# Patient Record
Sex: FEMALE | Race: WHITE | NOT HISPANIC OR LATINO
[De-identification: names, ages, dates, MRNs, and addresses within clinical notes are randomized per-mention and may not be internally consistent; named-entity substitution may affect disease eponyms.]

---

## 2017-02-13 ENCOUNTER — APPOINTMENT (OUTPATIENT)
Dept: ENDOCRINOLOGY | Facility: CLINIC | Age: 37
End: 2017-02-13

## 2017-02-13 ENCOUNTER — APPOINTMENT (OUTPATIENT)
Dept: MATERNAL FETAL MEDICINE | Facility: CLINIC | Age: 37
End: 2017-02-13

## 2017-02-13 VITALS
OXYGEN SATURATION: 98 % | HEART RATE: 104 BPM | DIASTOLIC BLOOD PRESSURE: 70 MMHG | BODY MASS INDEX: 32.44 KG/M2 | HEIGHT: 64 IN | SYSTOLIC BLOOD PRESSURE: 120 MMHG | WEIGHT: 190 LBS

## 2017-02-13 DIAGNOSIS — O24.410 GESTATIONAL DIABETES MELLITUS IN PREGNANCY, DIET CONTROLLED: ICD-10-CM

## 2017-02-13 RX ORDER — BLOOD SUGAR DIAGNOSTIC
STRIP MISCELLANEOUS
Qty: 1 | Refills: 6 | Status: COMPLETED | COMMUNITY
Start: 2017-02-13 | End: 2018-02-13

## 2017-02-13 RX ORDER — LANCETS 33 GAUGE
EACH MISCELLANEOUS
Qty: 1 | Refills: 5 | Status: COMPLETED | COMMUNITY
Start: 2017-02-13 | End: 2018-02-13

## 2017-02-13 RX ORDER — URINE ACETONE TEST STRIPS
STRIP MISCELLANEOUS
Qty: 1 | Refills: 6 | Status: COMPLETED | COMMUNITY
Start: 2017-02-13 | End: 2018-02-13

## 2017-02-15 ENCOUNTER — APPOINTMENT (OUTPATIENT)
Dept: MATERNAL FETAL MEDICINE | Facility: CLINIC | Age: 37
End: 2017-02-15

## 2017-02-15 DIAGNOSIS — O24.419 GESTATIONAL DIABETES MELLITUS IN PREGNANCY, UNSPECIFIED CONTROL: ICD-10-CM

## 2017-02-15 RX ORDER — METFORMIN ER 500 MG 500 MG/1
500 TABLET ORAL
Qty: 120 | Refills: 3 | Status: ACTIVE | COMMUNITY
Start: 2017-02-15 | End: 1900-01-01

## 2017-02-16 ENCOUNTER — APPOINTMENT (OUTPATIENT)
Dept: ANTEPARTUM | Facility: CLINIC | Age: 37
End: 2017-02-16

## 2017-02-16 ENCOUNTER — ASOB RESULT (OUTPATIENT)
Age: 37
End: 2017-02-16

## 2017-03-02 ENCOUNTER — APPOINTMENT (OUTPATIENT)
Dept: MATERNAL FETAL MEDICINE | Facility: CLINIC | Age: 37
End: 2017-03-02

## 2017-03-15 ENCOUNTER — APPOINTMENT (OUTPATIENT)
Dept: ANTEPARTUM | Facility: CLINIC | Age: 37
End: 2017-03-15

## 2017-03-15 ENCOUNTER — APPOINTMENT (OUTPATIENT)
Dept: MATERNAL FETAL MEDICINE | Facility: CLINIC | Age: 37
End: 2017-03-15

## 2017-03-15 ENCOUNTER — ASOB RESULT (OUTPATIENT)
Age: 37
End: 2017-03-15

## 2017-03-15 ENCOUNTER — LABORATORY RESULT (OUTPATIENT)
Age: 37
End: 2017-03-15

## 2017-03-15 RX ORDER — PEN NEEDLE, DIABETIC 29 G X1/2"
32G X 4 MM NEEDLE, DISPOSABLE MISCELLANEOUS
Qty: 1 | Refills: 3 | Status: ACTIVE | COMMUNITY
Start: 2017-03-15 | End: 1900-01-01

## 2017-03-15 RX ORDER — INSULIN HUMAN 100 [IU]/ML
100 INJECTION, SUSPENSION SUBCUTANEOUS
Qty: 1 | Refills: 3 | Status: ACTIVE | COMMUNITY
Start: 2017-03-15 | End: 1900-01-01

## 2017-03-22 ENCOUNTER — APPOINTMENT (OUTPATIENT)
Dept: ENDOCRINOLOGY | Facility: CLINIC | Age: 37
End: 2017-03-22

## 2017-03-23 LAB
T3 SERPL-MCNC: 141 NG/DL
T3RU NFR SERPL: 1.34 INDEX
T4 SERPL-MCNC: 9.6 UG/DL
TSH SERPL-ACNC: 1.15 UIU/ML

## 2017-03-29 ENCOUNTER — APPOINTMENT (OUTPATIENT)
Dept: MATERNAL FETAL MEDICINE | Facility: CLINIC | Age: 37
End: 2017-03-29

## 2017-04-07 ENCOUNTER — APPOINTMENT (OUTPATIENT)
Dept: ANTEPARTUM | Facility: CLINIC | Age: 37
End: 2017-04-07

## 2017-04-11 ENCOUNTER — APPOINTMENT (OUTPATIENT)
Dept: ANTEPARTUM | Facility: CLINIC | Age: 37
End: 2017-04-11

## 2017-04-11 ENCOUNTER — ASOB RESULT (OUTPATIENT)
Age: 37
End: 2017-04-11

## 2017-04-19 ENCOUNTER — APPOINTMENT (OUTPATIENT)
Dept: MATERNAL FETAL MEDICINE | Facility: CLINIC | Age: 37
End: 2017-04-19

## 2017-05-01 ENCOUNTER — ASOB RESULT (OUTPATIENT)
Age: 37
End: 2017-05-01

## 2017-05-01 ENCOUNTER — OUTPATIENT (OUTPATIENT)
Dept: OUTPATIENT SERVICES | Facility: HOSPITAL | Age: 37
LOS: 1 days | End: 2017-05-01
Payer: COMMERCIAL

## 2017-05-01 ENCOUNTER — APPOINTMENT (OUTPATIENT)
Dept: ANTEPARTUM | Facility: CLINIC | Age: 37
End: 2017-05-01

## 2017-05-01 DIAGNOSIS — Z01.818 ENCOUNTER FOR OTHER PREPROCEDURAL EXAMINATION: ICD-10-CM

## 2017-05-01 PROCEDURE — 76816 OB US FOLLOW-UP PER FETUS: CPT

## 2017-05-01 PROCEDURE — 76818 FETAL BIOPHYS PROFILE W/NST: CPT

## 2017-05-03 ENCOUNTER — TRANSCRIPTION ENCOUNTER (OUTPATIENT)
Age: 37
End: 2017-05-03

## 2017-05-03 ENCOUNTER — INPATIENT (INPATIENT)
Facility: HOSPITAL | Age: 37
LOS: 2 days | Discharge: HOME | End: 2017-05-06
Attending: OBSTETRICS & GYNECOLOGY | Admitting: OBSTETRICS & GYNECOLOGY
Payer: COMMERCIAL

## 2017-05-03 ENCOUNTER — OUTPATIENT (OUTPATIENT)
Dept: OUTPATIENT SERVICES | Facility: HOSPITAL | Age: 37
LOS: 1 days | End: 2017-05-03
Payer: COMMERCIAL

## 2017-05-03 VITALS — WEIGHT: 191.8 LBS | HEIGHT: 64 IN

## 2017-05-03 DIAGNOSIS — Z3A.00 WEEKS OF GESTATION OF PREGNANCY NOT SPECIFIED: ICD-10-CM

## 2017-05-03 DIAGNOSIS — O26.899 OTHER SPECIFIED PREGNANCY RELATED CONDITIONS, UNSPECIFIED TRIMESTER: ICD-10-CM

## 2017-05-03 DIAGNOSIS — O47.1 FALSE LABOR AT OR AFTER 37 COMPLETED WEEKS OF GESTATION: ICD-10-CM

## 2017-05-03 DIAGNOSIS — Z34.83 ENCOUNTER FOR SUPERVISION OF OTHER NORMAL PREGNANCY, THIRD TRIMESTER: ICD-10-CM

## 2017-05-03 DIAGNOSIS — D25.9 LEIOMYOMA OF UTERUS, UNSPECIFIED: ICD-10-CM

## 2017-05-03 LAB
BASOPHILS # BLD AUTO: 0.1 K/UL — SIGNIFICANT CHANGE UP (ref 0–0.2)
BASOPHILS NFR BLD AUTO: 0.7 % — SIGNIFICANT CHANGE UP (ref 0–2)
BLD GP AB SCN SERPL QL: NEGATIVE — SIGNIFICANT CHANGE UP
EOSINOPHIL # BLD AUTO: 0.4 K/UL — SIGNIFICANT CHANGE UP (ref 0–0.5)
EOSINOPHIL NFR BLD AUTO: 3.3 % — SIGNIFICANT CHANGE UP (ref 0–6)
HCT VFR BLD CALC: 44.2 % — SIGNIFICANT CHANGE UP (ref 34.5–45)
HGB BLD-MCNC: 15.3 G/DL — SIGNIFICANT CHANGE UP (ref 11.5–15.5)
LYMPHOCYTES # BLD AUTO: 18 % — SIGNIFICANT CHANGE UP (ref 13–44)
LYMPHOCYTES # BLD AUTO: 2.1 K/UL — SIGNIFICANT CHANGE UP (ref 1–3.3)
MCHC RBC-ENTMCNC: 31.9 PG — SIGNIFICANT CHANGE UP (ref 27–34)
MCHC RBC-ENTMCNC: 34.6 GM/DL — SIGNIFICANT CHANGE UP (ref 32–36)
MCV RBC AUTO: 92.2 FL — SIGNIFICANT CHANGE UP (ref 80–100)
MONOCYTES # BLD AUTO: 0.8 K/UL — SIGNIFICANT CHANGE UP (ref 0–0.9)
MONOCYTES NFR BLD AUTO: 6.9 % — SIGNIFICANT CHANGE UP (ref 2–14)
NEUTROPHILS # BLD AUTO: 8.2 K/UL — HIGH (ref 1.8–7.4)
NEUTROPHILS NFR BLD AUTO: 71.2 % — SIGNIFICANT CHANGE UP (ref 43–77)
PLATELET # BLD AUTO: 212 K/UL — SIGNIFICANT CHANGE UP (ref 150–400)
RBC # BLD: 4.8 M/UL — SIGNIFICANT CHANGE UP (ref 3.8–5.2)
RBC # FLD: 12.5 % — SIGNIFICANT CHANGE UP (ref 10.3–14.5)
RH IG SCN BLD-IMP: POSITIVE — SIGNIFICANT CHANGE UP
RH IG SCN BLD-IMP: POSITIVE — SIGNIFICANT CHANGE UP
WBC # BLD: 11.5 K/UL — HIGH (ref 3.8–10.5)
WBC # FLD AUTO: 11.5 K/UL — HIGH (ref 3.8–10.5)

## 2017-05-03 PROCEDURE — G0463: CPT

## 2017-05-03 RX ORDER — SODIUM CHLORIDE 9 MG/ML
1000 INJECTION, SOLUTION INTRAVENOUS
Qty: 0 | Refills: 0 | Status: DISCONTINUED | OUTPATIENT
Start: 2017-05-03 | End: 2017-05-03

## 2017-05-03 RX ORDER — LEVOTHYROXINE SODIUM 125 MCG
25 TABLET ORAL DAILY
Qty: 0 | Refills: 0 | Status: DISCONTINUED | OUTPATIENT
Start: 2017-05-03 | End: 2017-05-06

## 2017-05-03 RX ORDER — OXYTOCIN 10 UNIT/ML
41.67 VIAL (ML) INJECTION
Qty: 20 | Refills: 0 | Status: DISCONTINUED | OUTPATIENT
Start: 2017-05-03 | End: 2017-05-04

## 2017-05-03 RX ORDER — SODIUM CHLORIDE 9 MG/ML
1000 INJECTION, SOLUTION INTRAVENOUS ONCE
Qty: 0 | Refills: 0 | Status: COMPLETED | OUTPATIENT
Start: 2017-05-03 | End: 2017-05-03

## 2017-05-03 RX ORDER — FERROUS SULFATE 325(65) MG
325 TABLET ORAL DAILY
Qty: 0 | Refills: 0 | Status: DISCONTINUED | OUTPATIENT
Start: 2017-05-04 | End: 2017-05-06

## 2017-05-03 RX ORDER — CEFAZOLIN SODIUM 1 G
2000 VIAL (EA) INJECTION ONCE
Qty: 0 | Refills: 0 | Status: DISCONTINUED | OUTPATIENT
Start: 2017-05-03 | End: 2017-05-03

## 2017-05-03 RX ORDER — OXYCODONE HYDROCHLORIDE 5 MG/1
5 TABLET ORAL EVERY 4 HOURS
Qty: 0 | Refills: 0 | Status: DISCONTINUED | OUTPATIENT
Start: 2017-05-03 | End: 2017-05-06

## 2017-05-03 RX ORDER — DIPHENHYDRAMINE HCL 50 MG
25 CAPSULE ORAL EVERY 6 HOURS
Qty: 0 | Refills: 0 | Status: DISCONTINUED | OUTPATIENT
Start: 2017-05-04 | End: 2017-05-06

## 2017-05-03 RX ORDER — DOCUSATE SODIUM 100 MG
100 CAPSULE ORAL
Qty: 0 | Refills: 0 | Status: DISCONTINUED | OUTPATIENT
Start: 2017-05-04 | End: 2017-05-06

## 2017-05-03 RX ORDER — LANOLIN
1 OINTMENT (GRAM) TOPICAL
Qty: 0 | Refills: 0 | Status: DISCONTINUED | OUTPATIENT
Start: 2017-05-04 | End: 2017-05-06

## 2017-05-03 RX ORDER — OXYTOCIN 10 UNIT/ML
41.67 VIAL (ML) INJECTION
Qty: 20 | Refills: 0 | Status: DISCONTINUED | OUTPATIENT
Start: 2017-05-04 | End: 2017-05-06

## 2017-05-03 RX ORDER — SIMETHICONE 80 MG/1
80 TABLET, CHEWABLE ORAL EVERY 4 HOURS
Qty: 0 | Refills: 0 | Status: DISCONTINUED | OUTPATIENT
Start: 2017-05-04 | End: 2017-05-06

## 2017-05-03 RX ORDER — KETOROLAC TROMETHAMINE 30 MG/ML
30 SYRINGE (ML) INJECTION EVERY 6 HOURS
Qty: 0 | Refills: 0 | Status: DISCONTINUED | OUTPATIENT
Start: 2017-05-03 | End: 2017-05-06

## 2017-05-03 RX ORDER — DEXAMETHASONE 0.5 MG/5ML
4 ELIXIR ORAL EVERY 6 HOURS
Qty: 0 | Refills: 0 | Status: DISCONTINUED | OUTPATIENT
Start: 2017-05-04 | End: 2017-05-05

## 2017-05-03 RX ORDER — HEPARIN SODIUM 5000 [USP'U]/ML
5000 INJECTION INTRAVENOUS; SUBCUTANEOUS EVERY 12 HOURS
Qty: 0 | Refills: 0 | Status: DISCONTINUED | OUTPATIENT
Start: 2017-05-04 | End: 2017-05-06

## 2017-05-03 RX ORDER — TETANUS TOXOID, REDUCED DIPHTHERIA TOXOID AND ACELLULAR PERTUSSIS VACCINE, ADSORBED 5; 2.5; 8; 8; 2.5 [IU]/.5ML; [IU]/.5ML; UG/.5ML; UG/.5ML; UG/.5ML
0.5 SUSPENSION INTRAMUSCULAR ONCE
Qty: 0 | Refills: 0 | Status: DISCONTINUED | OUTPATIENT
Start: 2017-05-04 | End: 2017-05-06

## 2017-05-03 RX ORDER — ACETAMINOPHEN 500 MG
975 TABLET ORAL EVERY 6 HOURS
Qty: 0 | Refills: 0 | Status: COMPLETED | OUTPATIENT
Start: 2017-05-03 | End: 2018-04-01

## 2017-05-03 RX ORDER — SODIUM CHLORIDE 9 MG/ML
1000 INJECTION, SOLUTION INTRAVENOUS
Qty: 0 | Refills: 0 | Status: DISCONTINUED | OUTPATIENT
Start: 2017-05-03 | End: 2017-05-04

## 2017-05-03 RX ORDER — ONDANSETRON 8 MG/1
4 TABLET, FILM COATED ORAL EVERY 6 HOURS
Qty: 0 | Refills: 0 | Status: DISCONTINUED | OUTPATIENT
Start: 2017-05-04 | End: 2017-05-05

## 2017-05-03 RX ORDER — OXYCODONE HYDROCHLORIDE 5 MG/1
5 TABLET ORAL
Qty: 0 | Refills: 0 | Status: COMPLETED | OUTPATIENT
Start: 2017-05-03 | End: 2017-05-10

## 2017-05-03 RX ORDER — CITRIC ACID/SODIUM CITRATE 300-500 MG
15 SOLUTION, ORAL ORAL ONCE
Qty: 0 | Refills: 0 | Status: DISCONTINUED | OUTPATIENT
Start: 2017-05-03 | End: 2017-05-03

## 2017-05-03 RX ORDER — OXYTOCIN 10 UNIT/ML
333.33 VIAL (ML) INJECTION
Qty: 20 | Refills: 0 | Status: DISCONTINUED | OUTPATIENT
Start: 2017-05-03 | End: 2017-05-06

## 2017-05-03 RX ORDER — IBUPROFEN 200 MG
600 TABLET ORAL EVERY 6 HOURS
Qty: 0 | Refills: 0 | Status: COMPLETED | OUTPATIENT
Start: 2017-05-03 | End: 2018-04-01

## 2017-05-03 RX ORDER — SODIUM CHLORIDE 9 MG/ML
1000 INJECTION, SOLUTION INTRAVENOUS
Qty: 0 | Refills: 0 | Status: DISCONTINUED | OUTPATIENT
Start: 2017-05-04 | End: 2017-05-06

## 2017-05-03 RX ORDER — GLYCERIN ADULT
1 SUPPOSITORY, RECTAL RECTAL AT BEDTIME
Qty: 0 | Refills: 0 | Status: DISCONTINUED | OUTPATIENT
Start: 2017-05-04 | End: 2017-05-06

## 2017-05-03 RX ORDER — METOCLOPRAMIDE HCL 10 MG
10 TABLET ORAL ONCE
Qty: 0 | Refills: 0 | Status: DISCONTINUED | OUTPATIENT
Start: 2017-05-03 | End: 2017-05-03

## 2017-05-03 RX ORDER — NALOXONE HYDROCHLORIDE 4 MG/.1ML
0.1 SPRAY NASAL
Qty: 0 | Refills: 0 | Status: DISCONTINUED | OUTPATIENT
Start: 2017-05-04 | End: 2017-05-05

## 2017-05-03 RX ADMIN — Medication 1000 MILLIUNIT(S)/MIN: at 19:22

## 2017-05-03 RX ADMIN — SODIUM CHLORIDE 2000 MILLILITER(S): 9 INJECTION, SOLUTION INTRAVENOUS at 16:46

## 2017-05-03 NOTE — PATIENT PROFILE OB - BREAST MILK PROVIDES PROTECTION AGAINST INFECTION
Date: 2020      Patient Name: Annabel Robertson      : 3/21/1952        Thank you for choosing  [facility] as your health care provider. Your physician has deemed the following medical service(s) necessary.  However, your insuran Statement Selected

## 2017-05-04 DIAGNOSIS — O24.415 GESTATIONAL DIABETES MELLITUS IN PREGNANCY, CONTROLLED BY ORAL HYPOGLYCEMIC DRUGS: ICD-10-CM

## 2017-05-04 LAB
HCT VFR BLD CALC: 34.1 % — LOW (ref 34.5–45)
HGB BLD-MCNC: 11.7 G/DL — SIGNIFICANT CHANGE UP (ref 11.5–15.5)
MCHC RBC-ENTMCNC: 31.8 PG — SIGNIFICANT CHANGE UP (ref 27–34)
MCHC RBC-ENTMCNC: 34.3 GM/DL — SIGNIFICANT CHANGE UP (ref 32–36)
MCV RBC AUTO: 92.6 FL — SIGNIFICANT CHANGE UP (ref 80–100)
PLATELET # BLD AUTO: 169 K/UL — SIGNIFICANT CHANGE UP (ref 150–400)
RBC # BLD: 3.68 M/UL — LOW (ref 3.8–5.2)
RBC # FLD: 12.4 % — SIGNIFICANT CHANGE UP (ref 10.3–14.5)
T PALLIDUM AB TITR SER: NEGATIVE — SIGNIFICANT CHANGE UP
WBC # BLD: 14.7 K/UL — HIGH (ref 3.8–10.5)
WBC # FLD AUTO: 14.7 K/UL — HIGH (ref 3.8–10.5)

## 2017-05-04 RX ADMIN — HEPARIN SODIUM 5000 UNIT(S): 5000 INJECTION INTRAVENOUS; SUBCUTANEOUS at 00:44

## 2017-05-04 RX ADMIN — Medication 30 MILLIGRAM(S): at 06:43

## 2017-05-04 RX ADMIN — Medication 30 MILLIGRAM(S): at 00:43

## 2017-05-04 RX ADMIN — Medication 100 MILLIGRAM(S): at 11:29

## 2017-05-04 RX ADMIN — Medication 30 MILLIGRAM(S): at 06:13

## 2017-05-04 RX ADMIN — Medication 30 MILLIGRAM(S): at 11:27

## 2017-05-04 RX ADMIN — Medication 30 MILLIGRAM(S): at 18:11

## 2017-05-04 RX ADMIN — Medication 30 MILLIGRAM(S): at 18:41

## 2017-05-04 RX ADMIN — Medication 1 TABLET(S): at 11:29

## 2017-05-04 RX ADMIN — Medication 30 MILLIGRAM(S): at 01:12

## 2017-05-04 RX ADMIN — HEPARIN SODIUM 5000 UNIT(S): 5000 INJECTION INTRAVENOUS; SUBCUTANEOUS at 18:11

## 2017-05-04 RX ADMIN — Medication 325 MILLIGRAM(S): at 11:29

## 2017-05-04 RX ADMIN — Medication 30 MILLIGRAM(S): at 11:57

## 2017-05-05 ENCOUNTER — TRANSCRIPTION ENCOUNTER (OUTPATIENT)
Age: 37
End: 2017-05-05

## 2017-05-05 RX ORDER — METFORMIN HYDROCHLORIDE 850 MG/1
1 TABLET ORAL
Qty: 0 | Refills: 0 | COMMUNITY

## 2017-05-05 RX ORDER — SIMETHICONE 80 MG/1
1 TABLET, CHEWABLE ORAL
Qty: 0 | Refills: 0 | DISCHARGE
Start: 2017-05-05

## 2017-05-05 RX ORDER — ACETAMINOPHEN 500 MG
3 TABLET ORAL
Qty: 0 | Refills: 0 | DISCHARGE
Start: 2017-05-05

## 2017-05-05 RX ORDER — OXYCODONE HYDROCHLORIDE 5 MG/1
5 TABLET ORAL
Qty: 0 | Refills: 0 | Status: DISCONTINUED | OUTPATIENT
Start: 2017-05-05 | End: 2017-05-06

## 2017-05-05 RX ORDER — HUMAN INSULIN 100 [IU]/ML
26 INJECTION, SUSPENSION SUBCUTANEOUS
Qty: 0 | Refills: 0 | COMMUNITY

## 2017-05-05 RX ORDER — ACETAMINOPHEN 500 MG
975 TABLET ORAL EVERY 6 HOURS
Qty: 0 | Refills: 0 | Status: DISCONTINUED | OUTPATIENT
Start: 2017-05-05 | End: 2017-05-06

## 2017-05-05 RX ORDER — DOCUSATE SODIUM 100 MG
1 CAPSULE ORAL
Qty: 0 | Refills: 0 | DISCHARGE
Start: 2017-05-05

## 2017-05-05 RX ORDER — IBUPROFEN 200 MG
1 TABLET ORAL
Qty: 0 | Refills: 0 | DISCHARGE
Start: 2017-05-05

## 2017-05-05 RX ORDER — IBUPROFEN 200 MG
600 TABLET ORAL EVERY 6 HOURS
Qty: 0 | Refills: 0 | Status: DISCONTINUED | OUTPATIENT
Start: 2017-05-05 | End: 2017-05-06

## 2017-05-05 RX ORDER — LEVOTHYROXINE SODIUM 125 MCG
1 TABLET ORAL
Qty: 0 | Refills: 0 | COMMUNITY

## 2017-05-05 RX ORDER — ACETAMINOPHEN 500 MG
2 TABLET ORAL
Qty: 18 | Refills: 0 | OUTPATIENT
Start: 2017-05-05 | End: 2017-05-08

## 2017-05-05 RX ADMIN — Medication 30 MILLIGRAM(S): at 06:19

## 2017-05-05 RX ADMIN — Medication 600 MILLIGRAM(S): at 12:20

## 2017-05-05 RX ADMIN — HEPARIN SODIUM 5000 UNIT(S): 5000 INJECTION INTRAVENOUS; SUBCUTANEOUS at 18:04

## 2017-05-05 RX ADMIN — HEPARIN SODIUM 5000 UNIT(S): 5000 INJECTION INTRAVENOUS; SUBCUTANEOUS at 06:19

## 2017-05-05 RX ADMIN — Medication 975 MILLIGRAM(S): at 11:51

## 2017-05-05 RX ADMIN — Medication 975 MILLIGRAM(S): at 18:35

## 2017-05-05 RX ADMIN — Medication 975 MILLIGRAM(S): at 18:04

## 2017-05-05 RX ADMIN — Medication 600 MILLIGRAM(S): at 11:51

## 2017-05-05 RX ADMIN — Medication 100 MILLIGRAM(S): at 11:51

## 2017-05-05 RX ADMIN — Medication 325 MILLIGRAM(S): at 11:51

## 2017-05-05 RX ADMIN — Medication 1 TABLET(S): at 11:51

## 2017-05-05 RX ADMIN — Medication 975 MILLIGRAM(S): at 12:20

## 2017-05-05 RX ADMIN — Medication 30 MILLIGRAM(S): at 01:05

## 2017-05-05 RX ADMIN — Medication 600 MILLIGRAM(S): at 18:04

## 2017-05-05 RX ADMIN — Medication 30 MILLIGRAM(S): at 00:28

## 2017-05-05 RX ADMIN — Medication 600 MILLIGRAM(S): at 18:35

## 2017-05-05 NOTE — DISCHARGE NOTE OB - PATIENT PORTAL LINK FT
“You can access the FollowHealth Patient Portal, offered by [facility], by registering with the following website: [email or web address]”

## 2017-05-05 NOTE — DISCHARGE NOTE OB - CARE PLAN
Principal Discharge DX:	 delivery delivered  Goal:	recovery  Instructions for follow-up, activity and diet:	see below

## 2017-05-05 NOTE — DISCHARGE NOTE OB - MEDICATION SUMMARY - MEDICATIONS TO TAKE
I will START or STAY ON the medications listed below when I get home from the hospital:    acetaminophen 325 mg oral tablet  -- 3 tab(s) by mouth every 6 hours  -- Indication: For pain and fever    ibuprofen 600 mg oral tablet  -- 1 tab(s) by mouth every 6 hours  -- Indication: For pain and cramping    docusate sodium 100 mg oral capsule  -- 1 cap(s) by mouth 2 times a day, As needed, Stool Softening  -- Indication: For constipation    simethicone 80 mg oral tablet, chewable  -- 1 tab(s) by mouth every 4 hours, As needed, Gas  -- Indication: For gas pain

## 2017-05-05 NOTE — DISCHARGE NOTE OB - HOSPITAL COURSE
Pt presented for elective primary c/s; please refer to operative report for details of the procedure  Pt did well postpartum; she was voiding, ambulating and tolerating regular diet; she remained afebrile and her pain was well controlled  Pt was discharged home on POD 3 in stable condition

## 2017-05-06 VITALS
OXYGEN SATURATION: 99 % | RESPIRATION RATE: 18 BRPM | DIASTOLIC BLOOD PRESSURE: 81 MMHG | HEART RATE: 79 BPM | TEMPERATURE: 98 F | SYSTOLIC BLOOD PRESSURE: 117 MMHG

## 2017-05-06 LAB
HCT VFR BLD CALC: 36 % — SIGNIFICANT CHANGE UP (ref 34.5–45)
HGB BLD-MCNC: 12.2 G/DL — SIGNIFICANT CHANGE UP (ref 11.5–15.5)
MCHC RBC-ENTMCNC: 31.8 PG — SIGNIFICANT CHANGE UP (ref 27–34)
MCHC RBC-ENTMCNC: 33.8 GM/DL — SIGNIFICANT CHANGE UP (ref 32–36)
MCV RBC AUTO: 93.9 FL — SIGNIFICANT CHANGE UP (ref 80–100)
PLATELET # BLD AUTO: 223 K/UL — SIGNIFICANT CHANGE UP (ref 150–400)
RBC # BLD: 3.84 M/UL — SIGNIFICANT CHANGE UP (ref 3.8–5.2)
RBC # FLD: 12.3 % — SIGNIFICANT CHANGE UP (ref 10.3–14.5)
WBC # BLD: 10.3 K/UL — SIGNIFICANT CHANGE UP (ref 3.8–10.5)
WBC # FLD AUTO: 10.3 K/UL — SIGNIFICANT CHANGE UP (ref 3.8–10.5)

## 2017-05-06 PROCEDURE — 86901 BLOOD TYPING SEROLOGIC RH(D): CPT

## 2017-05-06 PROCEDURE — 86780 TREPONEMA PALLIDUM: CPT

## 2017-05-06 PROCEDURE — 59050 FETAL MONITOR W/REPORT: CPT

## 2017-05-06 PROCEDURE — 86900 BLOOD TYPING SEROLOGIC ABO: CPT

## 2017-05-06 PROCEDURE — 85027 COMPLETE CBC AUTOMATED: CPT

## 2017-05-06 PROCEDURE — 86850 RBC ANTIBODY SCREEN: CPT

## 2017-05-06 PROCEDURE — 59025 FETAL NON-STRESS TEST: CPT

## 2017-05-06 RX ADMIN — Medication 100 MILLIGRAM(S): at 06:21

## 2017-05-06 RX ADMIN — Medication 1 TABLET(S): at 12:14

## 2017-05-06 RX ADMIN — Medication 975 MILLIGRAM(S): at 00:18

## 2017-05-06 RX ADMIN — Medication 600 MILLIGRAM(S): at 00:17

## 2017-05-06 RX ADMIN — Medication 600 MILLIGRAM(S): at 06:20

## 2017-05-06 RX ADMIN — Medication 600 MILLIGRAM(S): at 07:00

## 2017-05-06 RX ADMIN — Medication 600 MILLIGRAM(S): at 12:15

## 2017-05-06 RX ADMIN — Medication 975 MILLIGRAM(S): at 01:00

## 2017-05-06 RX ADMIN — Medication 325 MILLIGRAM(S): at 12:14

## 2017-05-06 RX ADMIN — Medication 975 MILLIGRAM(S): at 07:00

## 2017-05-06 RX ADMIN — Medication 975 MILLIGRAM(S): at 06:20

## 2017-05-06 RX ADMIN — HEPARIN SODIUM 5000 UNIT(S): 5000 INJECTION INTRAVENOUS; SUBCUTANEOUS at 06:20

## 2017-05-06 RX ADMIN — Medication 600 MILLIGRAM(S): at 01:00

## 2017-05-06 RX ADMIN — Medication 975 MILLIGRAM(S): at 12:15

## 2017-05-06 NOTE — DIETITIAN INITIAL EVALUATION ADULT. - OTHER INFO
Pt is a  S/P caesarean due to cervical myoma with GDMA2 during this pregnancy. Pt was taking metformin prior to pregnancy for PCOS and was placed on 26units NPH nightly. Reports checking fingersticks 4 times daily PTA. Pt states she plans to exclusively breastfeed. Reports allergies to nuts, stone fruits, and apples.

## 2017-05-06 NOTE — DIETITIAN INITIAL EVALUATION ADULT. - NS AS NUTRI INTERV ED CONTENT
Provided education on postpartum GDM nutrition therapy including no longer monitoring blood glucose levels or counting carbohydrates. Discussed ways to decrease risk of developing T2DM including adequate exercise when approved by MD and losing pregnancy weight. Discussed importance for pt to follow up for GTT 6-12 weeks postpartum. Educated patient on increased need for energy and hydration when breastfeeding

## 2017-05-06 NOTE — DIETITIAN INITIAL EVALUATION ADULT. - ADHERENCE
good/Pt states she was monitoring intake of carbohydrates and limiting consumption of rice as it raised BG levels more than other carbohydrates. Reports taking prenatal MVI PTA.

## 2017-07-13 ENCOUNTER — APPOINTMENT (OUTPATIENT)
Dept: MATERNAL FETAL MEDICINE | Facility: CLINIC | Age: 37
End: 2017-07-13

## 2017-07-24 ENCOUNTER — APPOINTMENT (OUTPATIENT)
Dept: ENDOCRINOLOGY | Facility: CLINIC | Age: 37
End: 2017-07-24

## 2018-02-14 ENCOUNTER — APPOINTMENT (OUTPATIENT)
Dept: ENDOCRINOLOGY | Facility: CLINIC | Age: 38
End: 2018-02-14
Payer: COMMERCIAL

## 2018-02-14 VITALS
OXYGEN SATURATION: 98 % | BODY MASS INDEX: 30.9 KG/M2 | WEIGHT: 181 LBS | HEIGHT: 64 IN | SYSTOLIC BLOOD PRESSURE: 110 MMHG | HEART RATE: 79 BPM | DIASTOLIC BLOOD PRESSURE: 60 MMHG

## 2018-02-14 DIAGNOSIS — O24.414 GESTATIONAL DIABETES MELLITUS IN PREGNANCY, INSULIN CONTROLLED: ICD-10-CM

## 2018-02-14 DIAGNOSIS — E28.2 POLYCYSTIC OVARIAN SYNDROME: ICD-10-CM

## 2018-02-14 DIAGNOSIS — E05.90 THYROTOXICOSIS, UNSPECIFIED W/OUT THYROTOXIC CRISIS OR STORM: ICD-10-CM

## 2018-02-14 PROCEDURE — 99214 OFFICE O/P EST MOD 30 MIN: CPT

## 2018-02-20 LAB
T4 FREE SERPL-MCNC: 0.8 NG/DL
THYROGLOB AB SERPL-ACNC: 23.8 IU/ML
THYROPEROXIDASE AB SERPL IA-ACNC: 1542 IU/ML
TSH SERPL-ACNC: 27.74 UIU/ML

## 2018-02-22 LAB — TSI ACT/NOR SER: <0.1 IU/L

## 2018-03-26 ENCOUNTER — TRANSCRIPTION ENCOUNTER (OUTPATIENT)
Age: 38
End: 2018-03-26

## 2018-04-26 DIAGNOSIS — E06.9 THYROIDITIS, UNSPECIFIED: ICD-10-CM

## 2018-04-26 LAB
T4 FREE SERPL-MCNC: 1 NG/DL
TSH SERPL-ACNC: 8.97 UIU/ML

## 2018-05-15 ENCOUNTER — APPOINTMENT (OUTPATIENT)
Dept: ENDOCRINOLOGY | Facility: CLINIC | Age: 38
End: 2018-05-15

## 2019-06-24 ENCOUNTER — RESULT REVIEW (OUTPATIENT)
Age: 39
End: 2019-06-24

## 2019-08-20 ENCOUNTER — APPOINTMENT (OUTPATIENT)
Dept: ANTEPARTUM | Facility: CLINIC | Age: 39
End: 2019-08-20
Payer: COMMERCIAL

## 2019-08-20 ENCOUNTER — LABORATORY RESULT (OUTPATIENT)
Age: 39
End: 2019-08-20

## 2019-08-20 ENCOUNTER — ASOB RESULT (OUTPATIENT)
Age: 39
End: 2019-08-20

## 2019-08-20 PROCEDURE — 76801 OB US < 14 WKS SINGLE FETUS: CPT

## 2019-08-20 PROCEDURE — 36416 COLLJ CAPILLARY BLOOD SPEC: CPT

## 2019-08-20 PROCEDURE — 76813 OB US NUCHAL MEAS 1 GEST: CPT

## 2019-09-18 ENCOUNTER — ASOB RESULT (OUTPATIENT)
Age: 39
End: 2019-09-18

## 2019-09-18 ENCOUNTER — APPOINTMENT (OUTPATIENT)
Dept: ANTEPARTUM | Facility: CLINIC | Age: 39
End: 2019-09-18
Payer: COMMERCIAL

## 2019-09-18 PROCEDURE — 76805 OB US >/= 14 WKS SNGL FETUS: CPT

## 2019-10-15 ENCOUNTER — APPOINTMENT (OUTPATIENT)
Dept: ANTEPARTUM | Facility: CLINIC | Age: 39
End: 2019-10-15

## 2019-10-17 ENCOUNTER — APPOINTMENT (OUTPATIENT)
Dept: ANTEPARTUM | Facility: CLINIC | Age: 39
End: 2019-10-17
Payer: COMMERCIAL

## 2019-10-17 ENCOUNTER — ASOB RESULT (OUTPATIENT)
Age: 39
End: 2019-10-17

## 2019-10-17 PROCEDURE — 99201 OFFICE OUTPATIENT NEW 10 MINUTES: CPT | Mod: 25

## 2019-10-17 PROCEDURE — 76811 OB US DETAILED SNGL FETUS: CPT

## 2019-10-17 PROCEDURE — 76817 TRANSVAGINAL US OBSTETRIC: CPT

## 2019-10-31 ENCOUNTER — APPOINTMENT (OUTPATIENT)
Dept: ANTEPARTUM | Facility: CLINIC | Age: 39
End: 2019-10-31
Payer: COMMERCIAL

## 2019-10-31 ENCOUNTER — ASOB RESULT (OUTPATIENT)
Age: 39
End: 2019-10-31

## 2019-10-31 PROCEDURE — 76816 OB US FOLLOW-UP PER FETUS: CPT

## 2019-12-02 ENCOUNTER — APPOINTMENT (OUTPATIENT)
Dept: MATERNAL FETAL MEDICINE | Facility: CLINIC | Age: 39
End: 2019-12-02
Payer: COMMERCIAL

## 2019-12-02 ENCOUNTER — ASOB RESULT (OUTPATIENT)
Age: 39
End: 2019-12-02

## 2019-12-02 PROCEDURE — G0108 DIAB MANAGE TRN  PER INDIV: CPT

## 2019-12-02 RX ORDER — LANCETS 28 GAUGE
EACH MISCELLANEOUS
Qty: 2 | Refills: 6 | Status: ACTIVE | COMMUNITY
Start: 2019-12-02 | End: 1900-01-01

## 2019-12-02 RX ORDER — BLOOD SUGAR DIAGNOSTIC
STRIP MISCELLANEOUS
Qty: 200 | Refills: 2 | Status: ACTIVE | COMMUNITY
Start: 2019-12-02 | End: 1900-01-01

## 2019-12-02 RX ORDER — BLOOD-GLUCOSE METER
KIT MISCELLANEOUS
Qty: 1 | Refills: 0 | Status: ACTIVE | COMMUNITY
Start: 2019-12-02 | End: 1900-01-01

## 2019-12-02 RX ORDER — PEN NEEDLE, DIABETIC 29 G X1/2"
32G X 4 MM NEEDLE, DISPOSABLE MISCELLANEOUS
Qty: 1 | Refills: 1 | Status: ACTIVE | COMMUNITY
Start: 2019-12-02 | End: 1900-01-01

## 2019-12-02 RX ORDER — INSULIN HUMAN 100 [IU]/ML
100 INJECTION, SUSPENSION SUBCUTANEOUS
Qty: 5 | Refills: 1 | Status: ACTIVE | COMMUNITY
Start: 2019-12-02 | End: 1900-01-01

## 2019-12-05 ENCOUNTER — OTHER (OUTPATIENT)
Age: 39
End: 2019-12-05

## 2019-12-09 ENCOUNTER — ASOB RESULT (OUTPATIENT)
Age: 39
End: 2019-12-09

## 2019-12-09 ENCOUNTER — APPOINTMENT (OUTPATIENT)
Dept: ANTEPARTUM | Facility: CLINIC | Age: 39
End: 2019-12-09
Payer: COMMERCIAL

## 2019-12-09 PROCEDURE — 76816 OB US FOLLOW-UP PER FETUS: CPT

## 2019-12-12 ENCOUNTER — APPOINTMENT (OUTPATIENT)
Dept: MATERNAL FETAL MEDICINE | Facility: CLINIC | Age: 39
End: 2019-12-12
Payer: COMMERCIAL

## 2019-12-12 ENCOUNTER — ASOB RESULT (OUTPATIENT)
Age: 39
End: 2019-12-12

## 2019-12-12 PROCEDURE — G0108 DIAB MANAGE TRN  PER INDIV: CPT

## 2019-12-30 ENCOUNTER — APPOINTMENT (OUTPATIENT)
Dept: ANTEPARTUM | Facility: CLINIC | Age: 39
End: 2019-12-30
Payer: COMMERCIAL

## 2019-12-30 ENCOUNTER — ASOB RESULT (OUTPATIENT)
Age: 39
End: 2019-12-30

## 2019-12-30 PROCEDURE — 76816 OB US FOLLOW-UP PER FETUS: CPT

## 2020-01-02 ENCOUNTER — ASOB RESULT (OUTPATIENT)
Age: 40
End: 2020-01-02

## 2020-01-02 ENCOUNTER — APPOINTMENT (OUTPATIENT)
Dept: MATERNAL FETAL MEDICINE | Facility: CLINIC | Age: 40
End: 2020-01-02
Payer: COMMERCIAL

## 2020-01-02 PROCEDURE — G0108 DIAB MANAGE TRN  PER INDIV: CPT

## 2020-01-13 ENCOUNTER — APPOINTMENT (OUTPATIENT)
Dept: ANTEPARTUM | Facility: CLINIC | Age: 40
End: 2020-01-13
Payer: COMMERCIAL

## 2020-01-13 ENCOUNTER — ASOB RESULT (OUTPATIENT)
Age: 40
End: 2020-01-13

## 2020-01-13 PROCEDURE — 76816 OB US FOLLOW-UP PER FETUS: CPT

## 2020-01-23 ENCOUNTER — ASOB RESULT (OUTPATIENT)
Age: 40
End: 2020-01-23

## 2020-01-23 ENCOUNTER — APPOINTMENT (OUTPATIENT)
Dept: MATERNAL FETAL MEDICINE | Facility: CLINIC | Age: 40
End: 2020-01-23
Payer: COMMERCIAL

## 2020-01-23 PROCEDURE — G0108 DIAB MANAGE TRN  PER INDIV: CPT

## 2020-01-29 ENCOUNTER — APPOINTMENT (OUTPATIENT)
Dept: ANTEPARTUM | Facility: CLINIC | Age: 40
End: 2020-01-29
Payer: COMMERCIAL

## 2020-01-29 ENCOUNTER — ASOB RESULT (OUTPATIENT)
Age: 40
End: 2020-01-29

## 2020-01-29 PROCEDURE — 76816 OB US FOLLOW-UP PER FETUS: CPT

## 2020-02-10 ENCOUNTER — OUTPATIENT (OUTPATIENT)
Dept: OUTPATIENT SERVICES | Facility: HOSPITAL | Age: 40
LOS: 1 days | End: 2020-02-10
Payer: COMMERCIAL

## 2020-02-10 DIAGNOSIS — O34.219 MATERNAL CARE FOR UNSPECIFIED TYPE SCAR FROM PREVIOUS CESAREAN DELIVERY: ICD-10-CM

## 2020-02-10 DIAGNOSIS — Z01.818 ENCOUNTER FOR OTHER PREPROCEDURAL EXAMINATION: ICD-10-CM

## 2020-02-10 LAB
ANION GAP SERPL CALC-SCNC: 13 MMOL/L — SIGNIFICANT CHANGE UP (ref 5–17)
BLD GP AB SCN SERPL QL: NEGATIVE — SIGNIFICANT CHANGE UP
BUN SERPL-MCNC: 6 MG/DL — LOW (ref 7–23)
CALCIUM SERPL-MCNC: 9.3 MG/DL — SIGNIFICANT CHANGE UP (ref 8.4–10.5)
CHLORIDE SERPL-SCNC: 105 MMOL/L — SIGNIFICANT CHANGE UP (ref 96–108)
CO2 SERPL-SCNC: 19 MMOL/L — LOW (ref 22–31)
CREAT SERPL-MCNC: 0.43 MG/DL — LOW (ref 0.5–1.3)
GLUCOSE SERPL-MCNC: 114 MG/DL — HIGH (ref 70–99)
HCT VFR BLD CALC: 48.7 % — HIGH (ref 34.5–45)
HGB BLD-MCNC: 16.1 G/DL — HIGH (ref 11.5–15.5)
MCHC RBC-ENTMCNC: 31.1 PG — SIGNIFICANT CHANGE UP (ref 27–34)
MCHC RBC-ENTMCNC: 33.1 GM/DL — SIGNIFICANT CHANGE UP (ref 32–36)
MCV RBC AUTO: 94 FL — SIGNIFICANT CHANGE UP (ref 80–100)
NRBC # BLD: 0 /100 WBCS — SIGNIFICANT CHANGE UP (ref 0–0)
PLATELET # BLD AUTO: 210 K/UL — SIGNIFICANT CHANGE UP (ref 150–400)
POTASSIUM SERPL-MCNC: 3.7 MMOL/L — SIGNIFICANT CHANGE UP (ref 3.5–5.3)
POTASSIUM SERPL-SCNC: 3.7 MMOL/L — SIGNIFICANT CHANGE UP (ref 3.5–5.3)
RBC # BLD: 5.18 M/UL — SIGNIFICANT CHANGE UP (ref 3.8–5.2)
RBC # FLD: 14.2 % — SIGNIFICANT CHANGE UP (ref 10.3–14.5)
RH IG SCN BLD-IMP: POSITIVE — SIGNIFICANT CHANGE UP
SODIUM SERPL-SCNC: 137 MMOL/L — SIGNIFICANT CHANGE UP (ref 135–145)
WBC # BLD: 11.89 K/UL — HIGH (ref 3.8–10.5)
WBC # FLD AUTO: 11.89 K/UL — HIGH (ref 3.8–10.5)

## 2020-02-10 PROCEDURE — 86901 BLOOD TYPING SEROLOGIC RH(D): CPT

## 2020-02-10 PROCEDURE — 85027 COMPLETE CBC AUTOMATED: CPT

## 2020-02-10 PROCEDURE — 86850 RBC ANTIBODY SCREEN: CPT

## 2020-02-10 PROCEDURE — G0463: CPT

## 2020-02-10 PROCEDURE — 86900 BLOOD TYPING SEROLOGIC ABO: CPT

## 2020-02-10 PROCEDURE — 80048 BASIC METABOLIC PNL TOTAL CA: CPT

## 2020-02-10 RX ORDER — LEVOTHYROXINE SODIUM 125 MCG
1 TABLET ORAL
Qty: 0 | Refills: 0 | DISCHARGE

## 2020-02-10 RX ORDER — CEFAZOLIN SODIUM 1 G
2000 VIAL (EA) INJECTION ONCE
Refills: 0 | Status: DISCONTINUED | OUTPATIENT
Start: 2020-02-24 | End: 2020-02-27

## 2020-02-18 ENCOUNTER — ASOB RESULT (OUTPATIENT)
Age: 40
End: 2020-02-18

## 2020-02-18 ENCOUNTER — APPOINTMENT (OUTPATIENT)
Dept: ANTEPARTUM | Facility: CLINIC | Age: 40
End: 2020-02-18
Payer: COMMERCIAL

## 2020-02-18 PROCEDURE — 76816 OB US FOLLOW-UP PER FETUS: CPT

## 2020-02-23 ENCOUNTER — TRANSCRIPTION ENCOUNTER (OUTPATIENT)
Age: 40
End: 2020-02-23

## 2020-02-24 ENCOUNTER — INPATIENT (INPATIENT)
Facility: HOSPITAL | Age: 40
LOS: 2 days | Discharge: HOME | End: 2020-02-27
Attending: OBSTETRICS & GYNECOLOGY | Admitting: OBSTETRICS & GYNECOLOGY
Payer: COMMERCIAL

## 2020-02-24 VITALS — HEIGHT: 64 IN | WEIGHT: 200.62 LBS

## 2020-02-24 DIAGNOSIS — O34.219 MATERNAL CARE FOR UNSPECIFIED TYPE SCAR FROM PREVIOUS CESAREAN DELIVERY: ICD-10-CM

## 2020-02-24 LAB
BLD GP AB SCN SERPL QL: NEGATIVE — SIGNIFICANT CHANGE UP
GLUCOSE BLDC GLUCOMTR-MCNC: 77 MG/DL — SIGNIFICANT CHANGE UP (ref 70–99)
RH IG SCN BLD-IMP: POSITIVE — SIGNIFICANT CHANGE UP
T PALLIDUM AB TITR SER: NEGATIVE — SIGNIFICANT CHANGE UP

## 2020-02-24 PROCEDURE — 88302 TISSUE EXAM BY PATHOLOGIST: CPT | Mod: 26

## 2020-02-24 RX ORDER — DIPHENHYDRAMINE HCL 50 MG
25 CAPSULE ORAL EVERY 6 HOURS
Refills: 0 | Status: DISCONTINUED | OUTPATIENT
Start: 2020-02-24 | End: 2020-02-27

## 2020-02-24 RX ORDER — MAGNESIUM HYDROXIDE 400 MG/1
30 TABLET, CHEWABLE ORAL
Refills: 0 | Status: DISCONTINUED | OUTPATIENT
Start: 2020-02-24 | End: 2020-02-27

## 2020-02-24 RX ORDER — TETANUS TOXOID, REDUCED DIPHTHERIA TOXOID AND ACELLULAR PERTUSSIS VACCINE, ADSORBED 5; 2.5; 8; 8; 2.5 [IU]/.5ML; [IU]/.5ML; UG/.5ML; UG/.5ML; UG/.5ML
0.5 SUSPENSION INTRAMUSCULAR ONCE
Refills: 0 | Status: DISCONTINUED | OUTPATIENT
Start: 2020-02-24 | End: 2020-02-27

## 2020-02-24 RX ORDER — HEPARIN SODIUM 5000 [USP'U]/ML
5000 INJECTION INTRAVENOUS; SUBCUTANEOUS EVERY 12 HOURS
Refills: 0 | Status: DISCONTINUED | OUTPATIENT
Start: 2020-02-24 | End: 2020-02-27

## 2020-02-24 RX ORDER — NALOXONE HYDROCHLORIDE 4 MG/.1ML
0.1 SPRAY NASAL
Refills: 0 | Status: DISCONTINUED | OUTPATIENT
Start: 2020-02-24 | End: 2020-02-27

## 2020-02-24 RX ORDER — CITRIC ACID/SODIUM CITRATE 300-500 MG
15 SOLUTION, ORAL ORAL ONCE
Refills: 0 | Status: COMPLETED | OUTPATIENT
Start: 2020-02-24 | End: 2020-02-24

## 2020-02-24 RX ORDER — LANOLIN
1 OINTMENT (GRAM) TOPICAL EVERY 6 HOURS
Refills: 0 | Status: DISCONTINUED | OUTPATIENT
Start: 2020-02-24 | End: 2020-02-27

## 2020-02-24 RX ORDER — FENTANYL/BUPIVACAINE/NS/PF 2MCG/ML-.1
5 PLASTIC BAG, INJECTION (ML) INJECTION
Refills: 0 | Status: DISCONTINUED | OUTPATIENT
Start: 2020-02-24 | End: 2020-02-25

## 2020-02-24 RX ORDER — OXYTOCIN 10 UNIT/ML
333.33 VIAL (ML) INJECTION
Qty: 20 | Refills: 0 | Status: DISCONTINUED | OUTPATIENT
Start: 2020-02-24 | End: 2020-02-24

## 2020-02-24 RX ORDER — OXYTOCIN 10 UNIT/ML
333.33 VIAL (ML) INJECTION
Qty: 20 | Refills: 0 | Status: DISCONTINUED | OUTPATIENT
Start: 2020-02-24 | End: 2020-02-27

## 2020-02-24 RX ORDER — FENTANYL/BUPIVACAINE/NS/PF 2MCG/ML-.1
250 PLASTIC BAG, INJECTION (ML) INJECTION
Refills: 0 | Status: DISCONTINUED | OUTPATIENT
Start: 2020-02-24 | End: 2020-02-25

## 2020-02-24 RX ORDER — CHLORHEXIDINE GLUCONATE 213 G/1000ML
1 SOLUTION TOPICAL ONCE
Refills: 0 | Status: COMPLETED | OUTPATIENT
Start: 2020-02-24 | End: 2020-02-24

## 2020-02-24 RX ORDER — DEXAMETHASONE 0.5 MG/5ML
4 ELIXIR ORAL EVERY 6 HOURS
Refills: 0 | Status: DISCONTINUED | OUTPATIENT
Start: 2020-02-24 | End: 2020-02-27

## 2020-02-24 RX ORDER — SODIUM CHLORIDE 9 MG/ML
1000 INJECTION, SOLUTION INTRAVENOUS
Refills: 0 | Status: DISCONTINUED | OUTPATIENT
Start: 2020-02-24 | End: 2020-02-27

## 2020-02-24 RX ORDER — ACETAMINOPHEN 500 MG
975 TABLET ORAL
Refills: 0 | Status: DISCONTINUED | OUTPATIENT
Start: 2020-02-24 | End: 2020-02-27

## 2020-02-24 RX ORDER — KETOROLAC TROMETHAMINE 30 MG/ML
30 SYRINGE (ML) INJECTION EVERY 6 HOURS
Refills: 0 | Status: DISCONTINUED | OUTPATIENT
Start: 2020-02-24 | End: 2020-02-26

## 2020-02-24 RX ORDER — METOCLOPRAMIDE HCL 10 MG
10 TABLET ORAL ONCE
Refills: 0 | Status: DISCONTINUED | OUTPATIENT
Start: 2020-02-24 | End: 2020-02-24

## 2020-02-24 RX ORDER — OXYCODONE HYDROCHLORIDE 5 MG/1
5 TABLET ORAL ONCE
Refills: 0 | Status: DISCONTINUED | OUTPATIENT
Start: 2020-02-24 | End: 2020-02-27

## 2020-02-24 RX ORDER — SODIUM CHLORIDE 9 MG/ML
1000 INJECTION, SOLUTION INTRAVENOUS
Refills: 0 | Status: DISCONTINUED | OUTPATIENT
Start: 2020-02-24 | End: 2020-02-24

## 2020-02-24 RX ORDER — ONDANSETRON 8 MG/1
4 TABLET, FILM COATED ORAL EVERY 6 HOURS
Refills: 0 | Status: DISCONTINUED | OUTPATIENT
Start: 2020-02-24 | End: 2020-02-27

## 2020-02-24 RX ORDER — OXYCODONE HYDROCHLORIDE 5 MG/1
5 TABLET ORAL
Refills: 0 | Status: COMPLETED | OUTPATIENT
Start: 2020-02-24 | End: 2020-03-02

## 2020-02-24 RX ORDER — FAMOTIDINE 10 MG/ML
20 INJECTION INTRAVENOUS ONCE
Refills: 0 | Status: COMPLETED | OUTPATIENT
Start: 2020-02-24 | End: 2020-02-24

## 2020-02-24 RX ORDER — IBUPROFEN 200 MG
600 TABLET ORAL EVERY 6 HOURS
Refills: 0 | Status: COMPLETED | OUTPATIENT
Start: 2020-02-24 | End: 2021-01-22

## 2020-02-24 RX ORDER — GLYCERIN ADULT
1 SUPPOSITORY, RECTAL RECTAL AT BEDTIME
Refills: 0 | Status: DISCONTINUED | OUTPATIENT
Start: 2020-02-24 | End: 2020-02-27

## 2020-02-24 RX ORDER — SODIUM CHLORIDE 9 MG/ML
1000 INJECTION, SOLUTION INTRAVENOUS ONCE
Refills: 0 | Status: COMPLETED | OUTPATIENT
Start: 2020-02-24 | End: 2020-02-24

## 2020-02-24 RX ORDER — SIMETHICONE 80 MG/1
80 TABLET, CHEWABLE ORAL EVERY 4 HOURS
Refills: 0 | Status: DISCONTINUED | OUTPATIENT
Start: 2020-02-24 | End: 2020-02-27

## 2020-02-24 RX ADMIN — CHLORHEXIDINE GLUCONATE 1 APPLICATION(S): 213 SOLUTION TOPICAL at 10:30

## 2020-02-24 RX ADMIN — SODIUM CHLORIDE 2000 MILLILITER(S): 9 INJECTION, SOLUTION INTRAVENOUS at 10:30

## 2020-02-24 RX ADMIN — Medication 250 MILLILITER(S): at 16:15

## 2020-02-24 RX ADMIN — Medication 250 MILLILITER(S): at 17:27

## 2020-02-24 RX ADMIN — Medication 30 MILLIGRAM(S): at 21:19

## 2020-02-24 RX ADMIN — FAMOTIDINE 20 MILLIGRAM(S): 10 INJECTION INTRAVENOUS at 11:02

## 2020-02-24 RX ADMIN — HEPARIN SODIUM 5000 UNIT(S): 5000 INJECTION INTRAVENOUS; SUBCUTANEOUS at 21:20

## 2020-02-24 RX ADMIN — SODIUM CHLORIDE 125 MILLILITER(S): 9 INJECTION, SOLUTION INTRAVENOUS at 11:02

## 2020-02-24 RX ADMIN — Medication 15 MILLILITER(S): at 11:02

## 2020-02-24 NOTE — PATIENT PROFILE OB - CURRENT PREGNANCY COMPLICATIONS, OB PROFILE
ASSUMED CARE OF PT AT 0700. POTASSIUM WAS 3.3 ON 10/10. REDRAWN THIS AM AND
POTASSIUM LEVELS ARE NOW 4.9. CHANGED DRESSING ON SACRUM WITH NS AND APPLIED
SILVERDONE/ MORPHINE AND APPLIED A SACRAL BORDERED FOAM. BKA DRESSING CHANGED
BY PHYSICIAN ON 10/12/2019. UNABLE TO CHANGE DRESSIG ABOVE THE R KNEE DUE TO
KERLIX WRAP BY PHYSICIAN. 2L NASAL CANNULA REFUSED BY PT. FALL PRECAUTIONS IN
PLACE. CALL LIGHT WITHIN REACH. BED IN LOWEST POSITION WITH BED ALARM ON. WILL
CONTINUE TO MONITOR THE PT. None

## 2020-02-25 ENCOUNTER — TRANSCRIPTION ENCOUNTER (OUTPATIENT)
Age: 40
End: 2020-02-25

## 2020-02-25 LAB
BASOPHILS # BLD AUTO: 0.04 K/UL — SIGNIFICANT CHANGE UP (ref 0–0.2)
BASOPHILS NFR BLD AUTO: 0.3 % — SIGNIFICANT CHANGE UP (ref 0–2)
EOSINOPHIL # BLD AUTO: 0.02 K/UL — SIGNIFICANT CHANGE UP (ref 0–0.5)
EOSINOPHIL NFR BLD AUTO: 0.1 % — SIGNIFICANT CHANGE UP (ref 0–6)
HCT VFR BLD CALC: 42.1 % — SIGNIFICANT CHANGE UP (ref 34.5–45)
HGB BLD-MCNC: 14.1 G/DL — SIGNIFICANT CHANGE UP (ref 11.5–15.5)
IMM GRANULOCYTES NFR BLD AUTO: 0.4 % — SIGNIFICANT CHANGE UP (ref 0–1.5)
LYMPHOCYTES # BLD AUTO: 1.31 K/UL — SIGNIFICANT CHANGE UP (ref 1–3.3)
LYMPHOCYTES # BLD AUTO: 9.6 % — LOW (ref 13–44)
MCHC RBC-ENTMCNC: 31.6 PG — SIGNIFICANT CHANGE UP (ref 27–34)
MCHC RBC-ENTMCNC: 33.5 GM/DL — SIGNIFICANT CHANGE UP (ref 32–36)
MCV RBC AUTO: 94.4 FL — SIGNIFICANT CHANGE UP (ref 80–100)
MONOCYTES # BLD AUTO: 1.03 K/UL — HIGH (ref 0–0.9)
MONOCYTES NFR BLD AUTO: 7.5 % — SIGNIFICANT CHANGE UP (ref 2–14)
NEUTROPHILS # BLD AUTO: 11.23 K/UL — HIGH (ref 1.8–7.4)
NEUTROPHILS NFR BLD AUTO: 82.1 % — HIGH (ref 43–77)
NRBC # BLD: 0 /100 WBCS — SIGNIFICANT CHANGE UP (ref 0–0)
PLATELET # BLD AUTO: 171 K/UL — SIGNIFICANT CHANGE UP (ref 150–400)
RBC # BLD: 4.46 M/UL — SIGNIFICANT CHANGE UP (ref 3.8–5.2)
RBC # FLD: 14.1 % — SIGNIFICANT CHANGE UP (ref 10.3–14.5)
WBC # BLD: 13.69 K/UL — HIGH (ref 3.8–10.5)
WBC # FLD AUTO: 13.69 K/UL — HIGH (ref 3.8–10.5)

## 2020-02-25 RX ORDER — SIMETHICONE 80 MG/1
1 TABLET, CHEWABLE ORAL
Qty: 0 | Refills: 0 | DISCHARGE
Start: 2020-02-25

## 2020-02-25 RX ORDER — IBUPROFEN 200 MG
1 TABLET ORAL
Qty: 0 | Refills: 0 | DISCHARGE
Start: 2020-02-25

## 2020-02-25 RX ORDER — ACETAMINOPHEN 500 MG
3 TABLET ORAL
Qty: 0 | Refills: 0 | DISCHARGE
Start: 2020-02-25

## 2020-02-25 RX ORDER — IBUPROFEN 200 MG
600 TABLET ORAL EVERY 6 HOURS
Refills: 0 | Status: DISCONTINUED | OUTPATIENT
Start: 2020-02-25 | End: 2020-02-27

## 2020-02-25 RX ORDER — OXYCODONE HYDROCHLORIDE 5 MG/1
5 TABLET ORAL
Refills: 0 | Status: DISCONTINUED | OUTPATIENT
Start: 2020-02-25 | End: 2020-02-27

## 2020-02-25 RX ADMIN — Medication 975 MILLIGRAM(S): at 19:41

## 2020-02-25 RX ADMIN — HEPARIN SODIUM 5000 UNIT(S): 5000 INJECTION INTRAVENOUS; SUBCUTANEOUS at 15:14

## 2020-02-25 RX ADMIN — Medication 30 MILLIGRAM(S): at 03:10

## 2020-02-25 RX ADMIN — SIMETHICONE 80 MILLIGRAM(S): 80 TABLET, CHEWABLE ORAL at 07:52

## 2020-02-25 RX ADMIN — Medication 30 MILLIGRAM(S): at 10:42

## 2020-02-25 RX ADMIN — Medication 975 MILLIGRAM(S): at 13:17

## 2020-02-25 RX ADMIN — Medication 600 MILLIGRAM(S): at 17:17

## 2020-02-25 RX ADMIN — Medication 600 MILLIGRAM(S): at 22:23

## 2020-02-25 RX ADMIN — Medication 975 MILLIGRAM(S): at 00:02

## 2020-02-25 RX ADMIN — Medication 975 MILLIGRAM(S): at 14:00

## 2020-02-25 RX ADMIN — Medication 975 MILLIGRAM(S): at 20:21

## 2020-02-25 RX ADMIN — Medication 30 MILLIGRAM(S): at 11:30

## 2020-02-25 RX ADMIN — Medication 600 MILLIGRAM(S): at 23:18

## 2020-02-25 RX ADMIN — Medication 600 MILLIGRAM(S): at 16:47

## 2020-02-25 RX ADMIN — Medication 250 MILLILITER(S): at 05:32

## 2020-02-25 RX ADMIN — Medication 975 MILLIGRAM(S): at 00:32

## 2020-02-25 RX ADMIN — Medication 30 MILLIGRAM(S): at 03:40

## 2020-02-25 NOTE — DISCHARGE NOTE OB - CARE PROVIDER_API CALL
Messi Shetty)  Obstetrics and Gynecology  [address] [facility] [address] [facility], [address]  [place] [address]  Phone: [phone]  Fax: [phone]  Follow Up Time:

## 2020-02-25 NOTE — PROGRESS NOTE ADULT - PROBLEM SELECTOR PLAN 1
Increase OOB  d/c IVF  d/c PCEA  start PO pain protocol once PCEA removed  Dressing not removed- will reassess for removal later today vs in AM  Regular diet  AM CBC  Continue routine prenatal care

## 2020-02-25 NOTE — PROGRESS NOTE ADULT - ASSESSMENT
A/P:  39y  POD # 1 S/P  repeat   section, doing well    PMHx: fibroid uterus, gestational hypothyroidism Gestational diabetes  Current Issues: none

## 2020-02-25 NOTE — PROGRESS NOTE ADULT - SUBJECTIVE AND OBJECTIVE BOX
Day 1 of Anesthesia Pain Management Service    SUBJECTIVE: I'm okay  Pain Scale Score:    [X] Refer to charted pain scores    THERAPY: Epidural Bupivacaine 0.01 % and Fentanyl 3 micrograms/mL     Demand Dose: 3 mL  Lockout: 15 minutes   Continuous Rate:  10 mL    MEDICATIONS  (STANDING):  acetaminophen   Tablet .. 975 milliGRAM(s) Oral <User Schedule>  ceFAZolin   IVPB 2000 milliGRAM(s) IV Intermittent once  diphtheria/tetanus/pertussis (acellular) Vaccine (ADAcel) 0.5 milliLiter(s) IntraMuscular once  fentanyl (3 MICROgram(s)/mL) + bupivacaine 0.01% in 0.9% Sodium Chloride PCEA 250 milliLiter(s) Epidural PCA Continuous  heparin  Injectable 5000 Unit(s) SubCutaneous every 12 hours  ibuprofen  Tablet. 600 milliGRAM(s) Oral every 6 hours  ketorolac   Injectable 30 milliGRAM(s) IV Push every 6 hours  lactated ringers. 1000 milliLiter(s) (125 mL/Hr) IV Continuous <Continuous>  oxytocin Infusion 333.333 milliUNIT(s)/Min (1000 mL/Hr) IV Continuous <Continuous>    MEDICATIONS  (PRN):  dexAMETHasone  Injectable 4 milliGRAM(s) IV Push every 6 hours PRN Nausea  diphenhydrAMINE 25 milliGRAM(s) Oral every 6 hours PRN Itching  fentanyl (3 MICROgram(s)/mL) + bupivacaine 0.01% in 0.9% Sodium Chloride PCEA Rescue Clinician Bolus 5 milliLiter(s) Epidural every 15 minutes PRN Moderate Pain (4 - 6)  glycerin Suppository - Adult 1 Suppository(s) Rectal at bedtime PRN Constipation  lanolin Ointment 1 Application(s) Topical every 6 hours PRN Sore Nipples  magnesium hydroxide Suspension 30 milliLiter(s) Oral two times a day PRN Constipation  naloxone Injectable 0.1 milliGRAM(s) IV Push every 3 minutes PRN For ANY of the following changes in patient status:  A. RR LESS THAN 10 breaths per minute, B. Oxygen saturation LESS THAN 90%, C. Sedation score of 6  ondansetron Injectable 4 milliGRAM(s) IV Push every 6 hours PRN Nausea  oxyCODONE    IR 5 milliGRAM(s) Oral once PRN Moderate to Severe Pain (4-10)  oxyCODONE    IR 5 milliGRAM(s) Oral every 3 hours PRN Moderate to Severe Pain (4-10)  simethicone 80 milliGRAM(s) Chew every 4 hours PRN Gas      OBJECTIVE:    Assessment of Epidural Catheter Site: 	    [ ] Dressing intact	[X] Site non-tender	[X] Site without erythema, discharge, edema  [ ] Epidural tubing and connection checked	[X] Gross neurological exam within normal limits  [X] Catheter removed                          14.1   13.69 )-----------( 171      ( 25 Feb 2020 06:56 )             42.1     Vital Signs Last 24 Hrs  T(C): 36.7 (02-25-20 @ 10:00), Max: 36.9 (02-24-20 @ 15:30)  T(F): 98 (02-25-20 @ 10:00), Max: 98.4 (02-24-20 @ 15:30)  HR: 64 (02-25-20 @ 10:00) (61 - 82)  BP: 116/63 (02-25-20 @ 10:00) (102/64 - 120/65)  BP(mean): 76 (02-24-20 @ 17:30) (73 - 86)  RR: 18 (02-25-20 @ 10:00) (18 - 25)  SpO2: 97% (02-25-20 @ 10:00) (94% - 98%)      Sedation Score:	[X] Alert	[ ] Drowsy	[ ] Arousable  [ ] Asleep  [ ] Unresponsive    Side Effects:	[X] None	[ ] Nausea	[ ] Vomiting  [ ] Pruritus  		[ ] Weakness  [ ] Numbness  [ ] Other:    ASSESSMENT/ PLAN:    Therapy:                         [ ] Continue   [X] Discontinue   [X] Change to PRN Analgesics    Documentation and Verification of current medications:  [X] Done	[ ] Not done, not eligible, reason:    Comments: PCEA D\C'd.

## 2020-02-25 NOTE — LACTATION INITIAL EVALUATION - LACTATION INTERVENTIONS
initiate dual electric pump routine/initiate skin to skin/reviewed BF plan for infant with low blood sugar of breast/bottle/pump until her supply meets his demand and he feeds effectively and consistently. Assisted mother with latch/position, later went to her room and gave a full pump consult./initiate hand expression routine

## 2020-02-25 NOTE — PROGRESS NOTE ADULT - ATTENDING COMMENTS
Pt seen and evaluated.  She is doing well on POD 1 after  delivery.  Her pain is well controlled and she remained afebrile.    Incision is c/d/i.    Will continue routine PO care and repeat cbc on POD 3.

## 2020-02-25 NOTE — PROGRESS NOTE ADULT - SUBJECTIVE AND OBJECTIVE BOX
Postpartum Note-  Section POD#1    Allergies  No Known Drug Allergies  Nuts (Urticaria)  stone fruit (Urticaria)  RPR Negative  Blood Type  A  --  Positive  Rubella immune    Patient w/o complaints, pain is controlled.  Pt is OOB, tolerating PO, passing flatus. Lochia WNL. Voiding freely    O:  Vital Signs Last 24 Hrs  T(C): 36.7 (2020 06:15), Max: 36.9 (2020 15:30)  T(F): 98 (2020 06:15), Max: 98.4 (2020 15:30)  HR: 61 (2020 06:15) (61 - 82)  BP: 113/73 (2020 06:15) (102/64 - 120/65)  BP(mean): 76 (2020 17:30) (73 - 86)  RR: 18 (2020 06:15) (18 - 25)  SpO2: 95% (2020 06:15) (94% - 98%)  I&O's Summary    2020 07:01  -  2020 07:00  --------------------------------------------------------  IN: 1000 mL / OUT: 950 mL / NET: 50 mL        Gen: NAD  Heart: S1S2 RRR  Lungs: CTA b/l  Abdomen: Soft, nontender, R fundal fibroid palpable 5 fingerbreadths above umbilicus with  mildly distended, fundus firm. +BS   Incision: Dressing Clean, dry, and intact.  Negative erythema/edema/ecchymosis . Dressing not removed due to vertical incision.  Lochia WNL  Ext: PAS in place. Negative Homans B/L    LABS:  pending

## 2020-02-25 NOTE — LACTATION INITIAL EVALUATION - PRO FEED IN DELIVERY YN INFANT
2 1/2 year old unable to directly BF due to tongue-tie and refusal. Mother pumped exclusively for 9 months/yes

## 2020-02-25 NOTE — DISCHARGE NOTE OB - PATIENT PORTAL LINK FT
You can access the FollowMyHealth Patient Portal offered by [facility] by registering at the following website: [email or web address]. By joining [employer]’s FollowMyHealth portal, you will also be able to view your health information using other applications (apps) compatible with our system.

## 2020-02-25 NOTE — DISCHARGE NOTE OB - HOSPITAL COURSE
Pt underwent an uncomplicated repeat  section, please see delivery summary and operative report for details.  Pt did well postpartum.  She was voiding, ambulating and tolerating regular diet.  Her pain was well controlled and she remained afebrile.   Pt was discharged on POD 3 in stable condition.

## 2020-02-25 NOTE — DISCHARGE NOTE OB - PLAN OF CARE
recovery diet and activities as discussed and tolerated.  Please call office to schedule appointment for postpartum visit 5 weeks after delivery or earlier if needed diet and activities as discussed and tolerated.  Please call office to schedule appointment for postpartum visit next week to remove the staples and  then at 5 weeks after delivery or earlier if needed

## 2020-02-25 NOTE — DISCHARGE NOTE OB - CARE PLAN
Principal Discharge DX:	 delivery delivered  Goal:	recovery  Assessment and plan of treatment:	diet and activities as discussed and tolerated.  Please call office to schedule appointment for postpartum visit 5 weeks after delivery or earlier if needed Principal Discharge DX:	 delivery delivered  Goal:	recovery  Assessment and plan of treatment:	diet and activities as discussed and tolerated.  Please call office to schedule appointment for postpartum visit next week to remove the staples and  then at 5 weeks after delivery or earlier if needed

## 2020-02-25 NOTE — DISCHARGE NOTE OB - MEDICATION SUMMARY - MEDICATIONS TO TAKE
I will START or STAY ON the medications listed below when I get home from the hospital:    prenatal vitamin  -- once a day  -- Indication: For postpartum     ibuprofen 600 mg oral tablet  -- 1 tab(s) by mouth every 6 hours  -- Indication: For pain    acetaminophen 325 mg oral tablet  -- 3 tab(s) by mouth   -- Indication: For pain    simethicone 80 mg oral tablet, chewable  -- 1 tab(s) by mouth every 4 hours, As needed, Gas  -- Indication: For gas pain    Synthroid 50 mcg (0.05 mg) oral tablet  -- 1 tab(s) by mouth once a day  -- Indication: For hypothyroidism I will START or STAY ON the medications listed below when I get home from the hospital:    prenatal vitamin  -- once a day  -- Indication: For postpartum     ibuprofen 600 mg oral tablet  -- 1 tab(s) by mouth every 6 hours  -- Indication: For  delivery delivered    acetaminophen 325 mg oral tablet  -- 3 tab(s) by mouth   -- Indication: For  delivery delivered    simethicone 80 mg oral tablet, chewable  -- 1 tab(s) by mouth every 4 hours, As needed, Gas  -- Indication: For  delivery delivered    Synthroid 50 mcg (0.05 mg) oral tablet  -- 1 tab(s) by mouth once a day  -- Indication: For  delivery delivered

## 2020-02-25 NOTE — DIETITIAN INITIAL EVALUATION ADULT. - ENERGY NEEDS
ht: 64 inches. pre-pregnancy wt: 178 pounds. pregnancy wt gain: 24 pounds. pre-pregnancy BMI: 30.6 kG/m2. IBW: 120 pounds +/- 10%. %IBW: 148%.

## 2020-02-25 NOTE — DIETITIAN INITIAL EVALUATION ADULT. - ADD RECOMMEND
Post-partum GDM diet education reviewed: 1) Increased risk of developing T2DM with GDM and ways to help prevent development. 2) 4-12 week fasting oral glucose tolerance test follow-up as outpatient 3) General healthful diet and physical activity recommendations discussed 4) Increased energy needs with breastfeeding. After-delivery GDM education handout provided.

## 2020-02-25 NOTE — LACTATION INITIAL EVALUATION - INTERVENTION OUTCOME
verbalizes understanding/good return demonstration/demonstrates understanding of teaching/Infant refused to latch, too sleepy, obtaining drops of colostrum with pumping./needs met

## 2020-02-26 RX ADMIN — Medication 975 MILLIGRAM(S): at 21:34

## 2020-02-26 RX ADMIN — MAGNESIUM HYDROXIDE 30 MILLILITER(S): 400 TABLET, CHEWABLE ORAL at 11:26

## 2020-02-26 RX ADMIN — Medication 975 MILLIGRAM(S): at 16:10

## 2020-02-26 RX ADMIN — Medication 975 MILLIGRAM(S): at 15:14

## 2020-02-26 RX ADMIN — Medication 975 MILLIGRAM(S): at 02:30

## 2020-02-26 RX ADMIN — Medication 975 MILLIGRAM(S): at 01:47

## 2020-02-26 RX ADMIN — HEPARIN SODIUM 5000 UNIT(S): 5000 INJECTION INTRAVENOUS; SUBCUTANEOUS at 18:27

## 2020-02-26 RX ADMIN — Medication 975 MILLIGRAM(S): at 22:30

## 2020-02-26 RX ADMIN — HEPARIN SODIUM 5000 UNIT(S): 5000 INJECTION INTRAVENOUS; SUBCUTANEOUS at 04:32

## 2020-02-26 RX ADMIN — OXYCODONE HYDROCHLORIDE 5 MILLIGRAM(S): 5 TABLET ORAL at 08:49

## 2020-02-26 RX ADMIN — OXYCODONE HYDROCHLORIDE 5 MILLIGRAM(S): 5 TABLET ORAL at 00:11

## 2020-02-26 RX ADMIN — Medication 600 MILLIGRAM(S): at 04:32

## 2020-02-26 RX ADMIN — MAGNESIUM HYDROXIDE 30 MILLILITER(S): 400 TABLET, CHEWABLE ORAL at 00:10

## 2020-02-26 RX ADMIN — Medication 975 MILLIGRAM(S): at 09:20

## 2020-02-26 RX ADMIN — Medication 600 MILLIGRAM(S): at 12:20

## 2020-02-26 RX ADMIN — SIMETHICONE 80 MILLIGRAM(S): 80 TABLET, CHEWABLE ORAL at 00:10

## 2020-02-26 RX ADMIN — Medication 600 MILLIGRAM(S): at 05:06

## 2020-02-26 RX ADMIN — OXYCODONE HYDROCHLORIDE 5 MILLIGRAM(S): 5 TABLET ORAL at 09:40

## 2020-02-26 RX ADMIN — Medication 600 MILLIGRAM(S): at 11:26

## 2020-02-26 RX ADMIN — OXYCODONE HYDROCHLORIDE 5 MILLIGRAM(S): 5 TABLET ORAL at 01:00

## 2020-02-26 RX ADMIN — Medication 600 MILLIGRAM(S): at 18:27

## 2020-02-26 RX ADMIN — Medication 975 MILLIGRAM(S): at 08:23

## 2020-02-26 RX ADMIN — SIMETHICONE 80 MILLIGRAM(S): 80 TABLET, CHEWABLE ORAL at 08:26

## 2020-02-26 RX ADMIN — Medication 600 MILLIGRAM(S): at 00:45

## 2020-02-26 NOTE — PROGRESS NOTE ADULT - SUBJECTIVE AND OBJECTIVE BOX
Postpartum Note-  Section POD#2    Allergies    No Known Drug Allergies  Nuts (Urticaria)  stone fruit (Urticaria)    Blood type: A  Positive    RPR: Negative    Rubella: Immune    S:Patient is a  39y        POD#2 S/P C/Sec  Subjective: Patient w/o complaints, pain is controlled.  Pt is OOB, tolerating PO, passing flatus, and voiding. Lochia WNL.     Feeding: Breast  O:  Vital Signs Last 24 Hrs  T(C): 36.5 (2020 05:00), Max: 36.7 (2020 10:00)  T(F): 97.7 (2020 05:00), Max: 98.1 (2020 13:30)  HR: 71 (2020 05:00) (64 - 76)  BP: 124/78 (2020 05:00) (110/71 - 124/78)  RR: 18 (2020 05:00) (18 - 18)  SpO2: 94% (2020 05:00) (94% - 98%)      Gen: NAD  Abdomen: +BS, Soft, nontender, non distended, fundus firm.  Incision: Clean, dry, and intact.  Negative erythema/edema/ecchymosis.   Vertical skin - Staples  Lochia WNL  Ext:  Neg calf tenderness    LABS:                          14.1   13.69 )-----------( 171      ( 2020 06:56 )             42.1

## 2020-02-26 NOTE — PROGRESS NOTE ADULT - ASSESSMENT
A/P:  39y       S/P  repeat  section/MTCS secondary to large fibroid uterus   POD # 2, doing well    PMHx: none  Current Issues: 15cm ALPHNOSO darby

## 2020-02-27 VITALS
DIASTOLIC BLOOD PRESSURE: 78 MMHG | HEART RATE: 78 BPM | SYSTOLIC BLOOD PRESSURE: 119 MMHG | TEMPERATURE: 98 F | OXYGEN SATURATION: 94 % | RESPIRATION RATE: 18 BRPM

## 2020-02-27 LAB
BASOPHILS # BLD AUTO: 0.07 K/UL — SIGNIFICANT CHANGE UP (ref 0–0.2)
BASOPHILS NFR BLD AUTO: 0.8 % — SIGNIFICANT CHANGE UP (ref 0–2)
EOSINOPHIL # BLD AUTO: 0.2 K/UL — SIGNIFICANT CHANGE UP (ref 0–0.5)
EOSINOPHIL NFR BLD AUTO: 2.3 % — SIGNIFICANT CHANGE UP (ref 0–6)
HCT VFR BLD CALC: 45.5 % — HIGH (ref 34.5–45)
HGB BLD-MCNC: 14.5 G/DL — SIGNIFICANT CHANGE UP (ref 11.5–15.5)
IMM GRANULOCYTES NFR BLD AUTO: 0.7 % — SIGNIFICANT CHANGE UP (ref 0–1.5)
LYMPHOCYTES # BLD AUTO: 2 K/UL — SIGNIFICANT CHANGE UP (ref 1–3.3)
LYMPHOCYTES # BLD AUTO: 23.4 % — SIGNIFICANT CHANGE UP (ref 13–44)
MCHC RBC-ENTMCNC: 30.7 PG — SIGNIFICANT CHANGE UP (ref 27–34)
MCHC RBC-ENTMCNC: 31.9 GM/DL — LOW (ref 32–36)
MCV RBC AUTO: 96.2 FL — SIGNIFICANT CHANGE UP (ref 80–100)
MONOCYTES # BLD AUTO: 0.71 K/UL — SIGNIFICANT CHANGE UP (ref 0–0.9)
MONOCYTES NFR BLD AUTO: 8.3 % — SIGNIFICANT CHANGE UP (ref 2–14)
NEUTROPHILS # BLD AUTO: 5.51 K/UL — SIGNIFICANT CHANGE UP (ref 1.8–7.4)
NEUTROPHILS NFR BLD AUTO: 64.5 % — SIGNIFICANT CHANGE UP (ref 43–77)
NRBC # BLD: 0 /100 WBCS — SIGNIFICANT CHANGE UP (ref 0–0)
PLATELET # BLD AUTO: 181 K/UL — SIGNIFICANT CHANGE UP (ref 150–400)
RBC # BLD: 4.73 M/UL — SIGNIFICANT CHANGE UP (ref 3.8–5.2)
RBC # FLD: 14.1 % — SIGNIFICANT CHANGE UP (ref 10.3–14.5)
WBC # BLD: 8.55 K/UL — SIGNIFICANT CHANGE UP (ref 3.8–10.5)
WBC # FLD AUTO: 8.55 K/UL — SIGNIFICANT CHANGE UP (ref 3.8–10.5)

## 2020-02-27 PROCEDURE — 86900 BLOOD TYPING SEROLOGIC ABO: CPT

## 2020-02-27 PROCEDURE — 86780 TREPONEMA PALLIDUM: CPT

## 2020-02-27 PROCEDURE — 88302 TISSUE EXAM BY PATHOLOGIST: CPT

## 2020-02-27 PROCEDURE — 59025 FETAL NON-STRESS TEST: CPT

## 2020-02-27 PROCEDURE — 82962 GLUCOSE BLOOD TEST: CPT

## 2020-02-27 PROCEDURE — 86850 RBC ANTIBODY SCREEN: CPT

## 2020-02-27 PROCEDURE — 85027 COMPLETE CBC AUTOMATED: CPT

## 2020-02-27 PROCEDURE — 86901 BLOOD TYPING SEROLOGIC RH(D): CPT

## 2020-02-27 RX ORDER — OXYCODONE HYDROCHLORIDE 5 MG/1
1 TABLET ORAL
Qty: 20 | Refills: 0
Start: 2020-02-27

## 2020-02-27 RX ADMIN — Medication 975 MILLIGRAM(S): at 03:04

## 2020-02-27 RX ADMIN — OXYCODONE HYDROCHLORIDE 5 MILLIGRAM(S): 5 TABLET ORAL at 00:55

## 2020-02-27 RX ADMIN — Medication 600 MILLIGRAM(S): at 00:08

## 2020-02-27 RX ADMIN — Medication 975 MILLIGRAM(S): at 09:18

## 2020-02-27 RX ADMIN — Medication 600 MILLIGRAM(S): at 13:20

## 2020-02-27 RX ADMIN — Medication 975 MILLIGRAM(S): at 03:55

## 2020-02-27 RX ADMIN — Medication 600 MILLIGRAM(S): at 00:55

## 2020-02-27 RX ADMIN — Medication 975 MILLIGRAM(S): at 16:41

## 2020-02-27 RX ADMIN — Medication 600 MILLIGRAM(S): at 05:30

## 2020-02-27 RX ADMIN — Medication 975 MILLIGRAM(S): at 09:49

## 2020-02-27 RX ADMIN — Medication 975 MILLIGRAM(S): at 16:11

## 2020-02-27 RX ADMIN — OXYCODONE HYDROCHLORIDE 5 MILLIGRAM(S): 5 TABLET ORAL at 16:11

## 2020-02-27 RX ADMIN — OXYCODONE HYDROCHLORIDE 5 MILLIGRAM(S): 5 TABLET ORAL at 00:08

## 2020-02-27 RX ADMIN — HEPARIN SODIUM 5000 UNIT(S): 5000 INJECTION INTRAVENOUS; SUBCUTANEOUS at 05:30

## 2020-02-27 RX ADMIN — Medication 600 MILLIGRAM(S): at 12:50

## 2020-02-27 NOTE — PROGRESS NOTE ADULT - ATTENDING COMMENTS
I have personally seen, examined, and participated in the care of this patient. I have reviewed all pertinent clinical information, including history, physical exam, plan, and the Resident 's note and agree except as noted.

## 2020-02-27 NOTE — PROGRESS NOTE ADULT - SUBJECTIVE AND OBJECTIVE BOX
Postpartum Note-  Section POD#3    Allergies    No Known Drug Allergies  Nuts (Urticaria)  stone fruit (Urticaria)    Intolerances        Rubella IgG:        Immune              RPR:                  Negative         Blood Type:       A+    S:Patient is a  39y G 2   P 2   POD#3 S/P C/Sec  Subjective: Patient w/o complaints, pain is controlled.  Pt is OOB, tolerating PO, passing flatus. Lochia WNL.   Feeding: Breastfeeding    O:  Vital Signs Last 24 Hrs  T(C): 36.7 (2020 05:00), Max: 36.7 (2020 05:00)  T(F): 98.1 (2020 05:00), Max: 98.1 (2020 05:00)  HR: 78 (2020 05:00) (73 - 78)  BP: 119/78 (2020 05:00) (119/78 - 123/84)  BP(mean): --  RR: 18 (2020 05:00) (18 - 18)  SpO2: 94% (2020 05:00) (94% - 98%)     Gen: NAD  CV: rrr s1s2, CTABL  Abdomen: Soft, nontender, non-distended, fundus firm.  Bowel Sounds x 4 quadrants  Incision: Clean, dry, and intact.  Negative erythema/edema/ecchymosis    Vertical Staples  Lochia WNL  Ext:  Neg Edema, Neg Calf tenderness. Pedal pulses palpated B/L    LABS:                          14.5   8.55  )-----------( 181      ( 2020 05:52 )             45.5       A/P:  39y  POD # 3 S/P  repeat   section, doing well    PMHx: hypothyroidism currently on synthroid 50 mcg  Current Issues: ALPHONSO fibroid 15 cm    Increase OOB  Regular diet  AM CBC  PO Pain Protocol  Routine Postop/Postpartum care  Discharge Planning

## 2020-04-25 ENCOUNTER — MESSAGE (OUTPATIENT)
Age: 40
End: 2020-04-25

## 2020-05-08 ENCOUNTER — TRANSCRIPTION ENCOUNTER (OUTPATIENT)
Age: 40
End: 2020-05-08

## 2021-12-05 NOTE — DIETITIAN INITIAL EVALUATION ADULT. - NS FNS CHANGE IN WEIGHT
0730 Bedside and Verbal shift change report given to April RN (oncoming nurse) by Eamon Richmond RN (offgoing nurse). Report included the following information SBAR and Kardex. This patient was assisted with Intentional Toileting every 2 hours during this shift as appropriate. Documentation of ambulation and output reflected on Flowsheet as appropriate. Purposeful hourly rounding was completed using AIDET and 5Ps. Outcomes of PHR documented as they occurred. Bed alarm in use as appropriate. Dual Suction and ambubag in place. 1115 Patient complained of nausea and chest muscle pain with vomiting. MD in room and aware. 1900 Bedside and Verbal shift change report given to Jason English (oncoming nurse) by April RN (offgoing nurse). Report included the following information SBAR and Kardex. Gain

## 2023-04-06 NOTE — ANESTHESIA FOLLOW-UP NOTE - NSCONDITION_GEN_ALL_CORE
Called to reach out to patient about lab results and to schedule an appointment. Patient did not answer and so another voice message was left for patient to contact clinic.     JULIET Bartholomew  Pronouns: She/Her/Hers  Patient Care Staff (PCS)  [facility]  [phone]     Stable
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
## 2024-09-26 NOTE — DIETITIAN INITIAL EVALUATION ADULT. - PERTINENT MEDS FT
Check BP once daily for 2 weeks at different times of the day.   Send in BP log via my chart.   Labs will be drawn today. You will see the results on My Chart and then I will reach out after I review them.   We will do Tdap today.   Consider ultrafast heart scan  Take prescription Vitamin D 98865 IUs weekly for 12 weeks then OTC 2000 IUs daily after that.      
Prenatal vitamin

## 2025-04-26 NOTE — DIETITIAN INITIAL EVALUATION ADULT. - OTHER INFO
negative...
Pt is a 39 year old  POD # 1 S/P repeat  section, antepartum course significant for GDMA1. Pt reports hx of GDM in prior pregnancy. She plans on exclusively breastfeeding infant.